# Patient Record
Sex: MALE | Race: WHITE | NOT HISPANIC OR LATINO | URBAN - METROPOLITAN AREA
[De-identification: names, ages, dates, MRNs, and addresses within clinical notes are randomized per-mention and may not be internally consistent; named-entity substitution may affect disease eponyms.]

---
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
## 2020-05-27 ENCOUNTER — IMPORTED ENCOUNTER (OUTPATIENT)
Dept: URBAN - METROPOLITAN AREA CLINIC 31 | Facility: CLINIC | Age: 20
End: 2020-05-27

## 2020-05-27 PROBLEM — H01.002: Noted: 2020-05-27

## 2020-05-27 PROBLEM — H01.004: Noted: 2020-05-27

## 2020-05-27 PROBLEM — H01.001: Noted: 2020-05-27

## 2020-05-27 PROBLEM — H01.005: Noted: 2020-05-27

## 2020-05-27 PROCEDURE — 92015 DETERMINE REFRACTIVE STATE: CPT

## 2020-05-27 PROCEDURE — 92004 COMPRE OPH EXAM NEW PT 1/>: CPT

## 2020-05-27 NOTE — PATIENT DISCUSSION
Blepharitis anterior type OU - The patient exhibits reddened crusty eyelid margins. Warm compresses and lid scrubs were recommended. Antibiotic topher to lid margin qhs. Artificial Tears to be used as needed for discomfort.

## 2020-05-27 NOTE — PATIENT DISCUSSION
1.  Refractive error Annual Good ocular health documented. Discussed options of glasses contacts or refractive surgery. Discussed importance of annual eye exams. 2.  Blepharitis anterior type OU - The patient exhibits reddened crusty eyelid margins. Warm compresses and lid scrubs were recommended. Antibiotic topher to lid margin qhs. Artificial Tears to be used as needed for discomfort.

## 2022-04-02 ASSESSMENT — VISUAL ACUITY
OD_SC: 20/25-2
OS_CC: J1+14''
OD_CC: J1+14''
OS_SC: 20/20

## 2022-04-02 ASSESSMENT — TONOMETRY
OS_IOP_MMHG: 16
OD_IOP_MMHG: 13
OD_IOP_MMHG: 16
OS_IOP_MMHG: 13